# Patient Record
Sex: FEMALE | Race: WHITE
[De-identification: names, ages, dates, MRNs, and addresses within clinical notes are randomized per-mention and may not be internally consistent; named-entity substitution may affect disease eponyms.]

---

## 2020-06-05 ENCOUNTER — HOSPITAL ENCOUNTER (EMERGENCY)
Dept: HOSPITAL 84 - D.ER | Age: 40
Discharge: HOME | End: 2020-06-05
Payer: MEDICAID

## 2020-06-05 VITALS — DIASTOLIC BLOOD PRESSURE: 52 MMHG | SYSTOLIC BLOOD PRESSURE: 101 MMHG

## 2020-06-05 VITALS
HEIGHT: 69 IN | BODY MASS INDEX: 25.23 KG/M2 | WEIGHT: 170.36 LBS | BODY MASS INDEX: 25.23 KG/M2 | WEIGHT: 170.36 LBS | HEIGHT: 69 IN

## 2020-06-05 DIAGNOSIS — N72: ICD-10-CM

## 2020-06-05 DIAGNOSIS — R30.0: ICD-10-CM

## 2020-06-05 DIAGNOSIS — R11.0: ICD-10-CM

## 2020-06-05 DIAGNOSIS — N73.9: Primary | ICD-10-CM

## 2020-06-05 DIAGNOSIS — N89.8: ICD-10-CM

## 2020-06-05 DIAGNOSIS — R10.30: ICD-10-CM

## 2020-08-12 ENCOUNTER — HOSPITAL ENCOUNTER (INPATIENT)
Dept: HOSPITAL 84 - D.ER | Age: 40
LOS: 2 days | Discharge: HOME | DRG: 537 | End: 2020-08-14
Attending: FAMILY MEDICINE | Admitting: FAMILY MEDICINE
Payer: MEDICAID

## 2020-08-12 VITALS — SYSTOLIC BLOOD PRESSURE: 141 MMHG | DIASTOLIC BLOOD PRESSURE: 83 MMHG

## 2020-08-12 VITALS
WEIGHT: 140 LBS | HEIGHT: 69 IN | BODY MASS INDEX: 20.73 KG/M2 | BODY MASS INDEX: 20.73 KG/M2 | HEIGHT: 69 IN | BODY MASS INDEX: 20.73 KG/M2 | WEIGHT: 140 LBS

## 2020-08-12 VITALS — SYSTOLIC BLOOD PRESSURE: 126 MMHG | DIASTOLIC BLOOD PRESSURE: 59 MMHG

## 2020-08-12 VITALS — DIASTOLIC BLOOD PRESSURE: 51 MMHG | SYSTOLIC BLOOD PRESSURE: 133 MMHG

## 2020-08-12 VITALS — SYSTOLIC BLOOD PRESSURE: 158 MMHG | DIASTOLIC BLOOD PRESSURE: 71 MMHG

## 2020-08-12 VITALS — DIASTOLIC BLOOD PRESSURE: 67 MMHG | SYSTOLIC BLOOD PRESSURE: 111 MMHG

## 2020-08-12 VITALS — DIASTOLIC BLOOD PRESSURE: 72 MMHG | SYSTOLIC BLOOD PRESSURE: 169 MMHG

## 2020-08-12 VITALS — DIASTOLIC BLOOD PRESSURE: 76 MMHG | SYSTOLIC BLOOD PRESSURE: 129 MMHG

## 2020-08-12 VITALS — SYSTOLIC BLOOD PRESSURE: 129 MMHG | DIASTOLIC BLOOD PRESSURE: 76 MMHG

## 2020-08-12 VITALS — DIASTOLIC BLOOD PRESSURE: 86 MMHG | SYSTOLIC BLOOD PRESSURE: 118 MMHG

## 2020-08-12 VITALS — DIASTOLIC BLOOD PRESSURE: 76 MMHG | SYSTOLIC BLOOD PRESSURE: 127 MMHG

## 2020-08-12 VITALS — SYSTOLIC BLOOD PRESSURE: 144 MMHG | DIASTOLIC BLOOD PRESSURE: 81 MMHG

## 2020-08-12 VITALS — SYSTOLIC BLOOD PRESSURE: 127 MMHG | DIASTOLIC BLOOD PRESSURE: 76 MMHG

## 2020-08-12 VITALS — DIASTOLIC BLOOD PRESSURE: 78 MMHG | SYSTOLIC BLOOD PRESSURE: 135 MMHG
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
## 2020-08-12 DIAGNOSIS — S73.004A: Primary | ICD-10-CM

## 2020-08-12 DIAGNOSIS — F17.200: ICD-10-CM

## 2020-08-12 DIAGNOSIS — S01.81XA: ICD-10-CM

## 2020-08-12 DIAGNOSIS — M87.9: ICD-10-CM

## 2020-08-12 LAB
ALBUMIN SERPL-MCNC: 3 G/DL (ref 3.4–5)
ALP SERPL-CCNC: 75 U/L (ref 30–120)
ALT SERPL-CCNC: 221 U/L (ref 10–68)
ANION GAP SERPL CALC-SCNC: 11 MMOL/L (ref 8–16)
BACTERIA #/AREA URNS HPF: (no result) /HPF
BASOPHILS NFR BLD AUTO: 0.2 % (ref 0–2)
BILIRUB SERPL-MCNC: 0.59 MG/DL (ref 0.2–1.3)
BILIRUB SERPL-MCNC: NEGATIVE MG/DL
BUN SERPL-MCNC: 13 MG/DL (ref 7–18)
CALCIUM SERPL-MCNC: 7.4 MG/DL (ref 8.5–10.1)
CHLORIDE SERPL-SCNC: 103 MMOL/L (ref 98–107)
CO2 SERPL-SCNC: 27.7 MMOL/L (ref 21–32)
CREAT SERPL-MCNC: 0.7 MG/DL (ref 0.6–1.3)
EOSINOPHIL NFR BLD: 1 % (ref 0–7)
ERYTHROCYTE [DISTWIDTH] IN BLOOD BY AUTOMATED COUNT: 14.8 % (ref 11.5–14.5)
GLOBULIN SER-MCNC: 4.8 G/L
GLUCOSE SERPL-MCNC: 119 MG/DL (ref 74–106)
GLUCOSE SERPL-MCNC: NEGATIVE MG/DL
HCG UR QL: NEGATIVE
HCT VFR BLD CALC: 34 % (ref 36–48)
HGB BLD-MCNC: 11.4 G/DL (ref 12–16)
IMM GRANULOCYTES NFR BLD: 0.2 % (ref 0–5)
KETONES UR STRIP-MCNC: NEGATIVE MG/DL
LYMPHOCYTES NFR BLD AUTO: 11 % (ref 15–50)
MCH RBC QN AUTO: 30.7 PG (ref 26–34)
MCHC RBC AUTO-ENTMCNC: 33.5 G/DL (ref 31–37)
MCV RBC: 91.6 FL (ref 80–100)
MONOCYTES NFR BLD: 11 % (ref 2–11)
NEUTROPHILS NFR BLD AUTO: 76.6 % (ref 40–80)
NITRITE UR-MCNC: NEGATIVE MG/ML
OSMOLALITY SERPL CALC.SUM OF ELEC: 276 MOSM/KG (ref 275–300)
PH UR STRIP: 8 [PH] (ref 5–6)
PLATELET # BLD: 137 10X3/UL (ref 130–400)
PMV BLD AUTO: 10.4 FL (ref 7.4–10.4)
POTASSIUM SERPL-SCNC: 3.7 MMOL/L (ref 3.5–5.1)
PROT SERPL-MCNC: 7.8 G/DL (ref 6.4–8.2)
RBC # BLD AUTO: 3.71 10X6/UL (ref 4–5.4)
RBC #/AREA URNS HPF: (no result) /HPF (ref 0–5)
SODIUM SERPL-SCNC: 138 MMOL/L (ref 136–145)
SQUAMOUS #/AREA URNS HPF: (no result) /HPF (ref 0–5)
UROBILINOGEN UR-MCNC: NORMAL MG/DL
WBC # BLD AUTO: 4.9 10X3/UL (ref 4.8–10.8)
WBC #/AREA URNS HPF: (no result) /HPF

## 2020-08-12 PROCEDURE — 0SS9XZZ REPOSITION RIGHT HIP JOINT, EXTERNAL APPROACH: ICD-10-PCS | Performed by: ORTHOPAEDIC SURGERY

## 2020-08-12 NOTE — NUR
PT LAYING IN BED, EYES CLOSED, EVEN RESPIRATIONS. PIV IN LEFT FOOT, PATENT, NO
REDNESS OR SWELLING. PLACED TELEMETRY ON PT, 74 SR. LACERATIONS ON FOREHEAD
AND ON HAIRLINE. BRUISES ON LEFT SHOULDER. ABDUCTION PILLOW IN PLACE BETWEEN
LEGS. BED LOW, RAILS X2. CL IN REACH. WILL CONTINUE TO MONITOR.

## 2020-08-12 NOTE — NUR
RESTIGN IN BED EYES CLOSED, AROUSED EASILY C/O PAIN TO RIGHT HIP WITH ANY
MOVEMENT, ABD PILLOW IN PLACE, SEE SHIFT ASSESSMENT, CALL LIGHT IN REACH

## 2020-08-12 NOTE — NUR
GUSTAVO GIVEN IM AS ORDERED, PT NOW MUCH CALMER, STATES THAT SHE HAS BEEN USING
HEROIN AND FENTAL AND HAS BEEN TO REHAB FOR THAT A COUPLE OF TIMES

## 2020-08-12 NOTE — NUR
PT CRYING LOUDLY AND TALKING WITH MOTHER ON PHONE SAYING THAT NURSES WOULD NOT
GIVE HER ANY PAIN MEDICATION HELP HER TURN, CNA HAS BEEN IN ROOM MULTIPLE
TIMES TO ASSIST WITH BEDPAN AND REPOSITIONED AND INFORMED MOTHER THAT
HYDROCODONE WAS GIVEN 2 HRS AGO, PT STATES THAT THATS NOT GOING TO CONTROL HER
PAIN BOTH PT AND MOTHER INSISTING THAT DR BE CALLED FOR MORE PAIN
MEDICATION,DR SIDDIQUI CALLED ORDERS RECIEVED FOR TORDOL 60MG IM Q 6 HRS PRN PT
INFORMED WILL GIVEN AS SOON AS AVAILABLE

## 2020-08-12 NOTE — NUR
PT A&O X4. PT ABLE TO EAT SOME OF MEAL. DENIES PAIN. PT ASKED ABOUT WHAT
HAPPENED, EDUCATED PT ON CURRENT SITUATION. EDUCATED PT ON HOW TO ADJUST BED,
CL AND NEEDS, PT VERBALIZED UNDERSTANDING. BED LOW, RAILS X2. CL IN REACH.
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
## 2020-08-13 VITALS — DIASTOLIC BLOOD PRESSURE: 61 MMHG | SYSTOLIC BLOOD PRESSURE: 106 MMHG

## 2020-08-13 VITALS — SYSTOLIC BLOOD PRESSURE: 104 MMHG | DIASTOLIC BLOOD PRESSURE: 63 MMHG

## 2020-08-13 VITALS — DIASTOLIC BLOOD PRESSURE: 63 MMHG | SYSTOLIC BLOOD PRESSURE: 109 MMHG

## 2020-08-13 VITALS — DIASTOLIC BLOOD PRESSURE: 57 MMHG | SYSTOLIC BLOOD PRESSURE: 108 MMHG

## 2020-08-13 VITALS — DIASTOLIC BLOOD PRESSURE: 63 MMHG | SYSTOLIC BLOOD PRESSURE: 111 MMHG

## 2020-08-13 LAB
ANION GAP SERPL CALC-SCNC: 8 MMOL/L (ref 8–16)
BASOPHILS NFR BLD AUTO: 0.1 % (ref 0–2)
BUN SERPL-MCNC: 16 MG/DL (ref 7–18)
CALCIUM SERPL-MCNC: 7.7 MG/DL (ref 8.5–10.1)
CHLORIDE SERPL-SCNC: 106 MMOL/L (ref 98–107)
CO2 SERPL-SCNC: 28.6 MMOL/L (ref 21–32)
CREAT SERPL-MCNC: 0.6 MG/DL (ref 0.6–1.3)
EOSINOPHIL NFR BLD: 1.5 % (ref 0–7)
ERYTHROCYTE [DISTWIDTH] IN BLOOD BY AUTOMATED COUNT: 15.1 % (ref 11.5–14.5)
GLUCOSE SERPL-MCNC: 83 MG/DL (ref 74–106)
HCT VFR BLD CALC: 34.1 % (ref 36–48)
HGB BLD-MCNC: 11.4 G/DL (ref 12–16)
IMM GRANULOCYTES NFR BLD: 0.2 % (ref 0–5)
LYMPHOCYTES NFR BLD AUTO: 18.8 % (ref 15–50)
MCH RBC QN AUTO: 31.1 PG (ref 26–34)
MCHC RBC AUTO-ENTMCNC: 33.4 G/DL (ref 31–37)
MCV RBC: 92.9 FL (ref 80–100)
MONOCYTES NFR BLD: 8.7 % (ref 2–11)
NEUTROPHILS NFR BLD AUTO: 70.7 % (ref 40–80)
OSMOLALITY SERPL CALC.SUM OF ELEC: 277 MOSM/KG (ref 275–300)
PLATELET # BLD: 112 10X3/UL (ref 130–400)
PMV BLD AUTO: 10.7 FL (ref 7.4–10.4)
POTASSIUM SERPL-SCNC: 3.6 MMOL/L (ref 3.5–5.1)
RBC # BLD AUTO: 3.67 10X6/UL (ref 4–5.4)
SODIUM SERPL-SCNC: 139 MMOL/L (ref 136–145)
WBC # BLD AUTO: 9.7 10X3/UL (ref 4.8–10.8)

## 2020-08-13 NOTE — NUR
C/O PAIN 8/10, PROVIDED PAIN MEDS PER ORDER. REMOVED ABDUCTION PILLOW.
EDUCATED PT ABOUT AMBULATING WITH PHYSICAL THERAPY, VERBALIZED UNDERSTANDING.
BED LOW, RAILS X2. CL IN REACH. WILL CONTINUE TO MONITOR.

## 2020-08-13 NOTE — NUR
PT LAYING IN BED, EYES CLOSED, EVEN RESPIRATIONS. BRUISES ON RIGHT SHOULDER.
ABRASIONS ON FOREHEAD AND HAIRLINE. TELEMETRY IN PLACE, 89 SR. EDUCATED SPOUSE
ON GOAL OF AMBULATING WITH PT TODAY. EDUCATED ON CL AND NEEDS. BED LOW, CL IN
REACH. WILL CONTINUE TO MONITOR.

## 2020-08-13 NOTE — MORECARE
CASE MANAGEMENT DISCHARGE SUMMARY
 
 
PATIENT: ZOE ASHRAF                         UNIT: [id number]
ACCOUNT#: [id number]                       ADM DATE: 20
AGE: 40     : [birth year]  SEX: F            ROOM/BED: D.1208    
AUTHOR: PANKAJ ARELLANO                             PHYSICIAN:                               
 
REFERRING PHYSICIAN: MANOJ POLLACK MD              
DATE OF SERVICE: 20
Discharge Plan
 
 
Patient Name: ZOE ASHRAF
Facility: [facility]:[place]
Encounter #: [id number]
Medical Record #: [id number]
: 1980
Planned Disposition: Home
Anticipated Discharge Date: 
 
Discharge Date: 
Expected LOS: 
Initial Reviewer: [email or web address]
Initial Review Date: 2020
Generated: 20   3:34 pm 
Comments
 
DCP- Discharge Planning
 
Updated by [id number]: Lanie Bo on 20   1:34 pm CT
Patient Name: ZOE ASHRAF                                     
Admission Status: ER   
Accout number: [id number]                              
Admission Date: 2020   
: 1980                                                        
Admission Diagnosis:   
Attending: MANOJ POLLACK                                                
Current LOS:  1   
  
Anticipated DC Date:    
Planned Disposition: Home   
Primary Insurance: [place]   
  
  
Discharge Planning Comments:   
  
LATE ENTRY 20 @ 1400  
CM MET WITH PATIENT TO ASSESS DSICHARGE PLANNING NEEDS. PATIENT STATED THAT 
SHE IS INDEPENDENT WITH HER CARE AT HOME BEFORE THIS HAPPENEND. SHE LIVES 
 
WITH HER , BUT THERE ARE 20 STEPS TO GET TO HER HOME. SHE TOLD THE MD 
THAT SHE WAS GOING TO STAY WITH HER MOTHER WHILE SHE RECOVERS.  SHE WILL NEED 
A WALKER AND I WILL ORDER ONE FOR HER BEFORE SHE DISCHARGES. CM TO FOLLOW AND 
ASSIST AS NEEDED  
  
  
  
  
: Lanie Bo
 DCPIA - Discharge Planning Initial Assessment
 
Updated by [id number]: Lanie Bo on 20   2:31 pm
*  Is the patient Alert and Oriented?
Yes
*  How many steps to enter\exit or inside your home? 20 *  PCP DR SANDRO WOLFE IN
[place]
*  Pharmacy
[facility] ON [place]
*  Preadmission Environment
Home with Family
*  ADLs
Independent
*  List name and contact numbers for known caregivers / representatives who 
currently or will assist patient after discharge:
EUSEBIA ( SPOUSE) [phone]
*  Verbal permission to speak to the caregivers and representatives has been 
obtained from the patient.
N/A
*  Community resources currently utilized
None
*  Additional services required to return to the preadmission environment?
Yes
*  Can the patient safely return to the preadmission environment?
Yes
*  Has this patient been hospitalized within the prior 30 days at any 
hospital?
No
 
 
 
 
 
 
Patient Name: ZOE ASHRAF
 
Encounter #: [id number]
Page [id number]
 
 
 
 
 
Electronically Signed by PANKAJ ARELLANO on 20 at 1435
 
 
 
 
 
 
**All edits/amendments must be made on the electronic document**
 
DICTATION DATE: 20     : BENJAMIN  20     
RPT#: [id number]                                DC DATE:        
                                               STATUS: ADM IN  
[facility]
 [place] [address]
***END OF REPORT***

## 2020-08-13 NOTE — MORECARE
CASE MANAGEMENT DISCHARGE SUMMARY
 
 
PATIENT: ZOE ASHRAF                         UNIT: [id number]
ACCOUNT#: [id number]                       ADM DATE: 20
AGE: 40     : 84  SEX: F            ROOM/BED: D.1208    
AUTHOR: PANKAJ ARELLANO                             PHYSICIAN:                               
 
REFERRING PHYSICIAN: MANOJ POLLACK MD              
DATE OF SERVICE: 20
Discharge Plan
 
 
Patient Name: ZOE ASHRAF
Facility: [facility]:[place]
Encounter #: [id number]
Medical Record #: [id number]
: 1980
Planned Disposition: Home
Anticipated Discharge Date: 
 
Discharge Date: 
Expected LOS: 
Initial Reviewer: [email or web address]
Initial Review Date: 2020
Generated: 20   3:41 pm 
Comments
 
DCP- Discharge Planning
 
Updated by [email or web address]: Lanie Bo on 20   1:40 pm CT
WALKER ORDER AND SENT TO [facility]
DCP- Discharge Planning
 
Updated by [email or web address]: Lanie Bo on 20   1:40 pm CT
PATIENT'S CELL NUMBER [phone]
DCP- Discharge Planning
 
Updated by [email or web address]: Lanie Bo on 20   1:34 pm CT
Patient Name: ZOE ASHRAF                                     
Admission Status: ER   
Accout number: [id number]                              
Admission Date: 2020   
: 1980                                                        
Admission Diagnosis:   
Attending: MANOJ POLLACK                                                
Current LOS:  1   
  
Anticipated DC Date:    
Planned Disposition: Home   
 
Primary Insurance: [place]   
  
  
Discharge Planning Comments:   
  
LATE ENTRY 20 @ 1400  
CM MET WITH PATIENT TO ASSESS DSICHARGE PLANNING NEEDS. PATIENT STATED THAT 
SHE IS INDEPENDENT WITH HER CARE AT HOME BEFORE THIS HAPPENEND. SHE LIVES 
WITH HER , BUT THERE ARE 20 STEPS TO GET TO HER HOME. SHE TOLD THE MD 
THAT SHE WAS GOING TO STAY WITH HER MOTHER WHILE SHE RECOVERS.  SHE WILL NEED 
A WALKER AND I WILL ORDER ONE FOR HER BEFORE SHE DISCHARGES. CM TO FOLLOW AND 
ASSIST AS NEEDED  
  
  
  
  
: Lanie Bo
 DCPIA - Discharge Planning Initial Assessment
 
Updated by [email or web address]: Lanie Bo on 20   2:31 pm
*  Is the patient Alert and Oriented?
Yes
*  How many steps to enter\exit or inside your home? 20 *  PCP DR SANDRO WOLFE IN
[place]
*  Pharmacy
[facility] ON [place]
*  Preadmission Environment
Home with Family
*  ADLs
Independent
*  List name and contact numbers for known caregivers / representatives who 
currently or will assist patient after discharge:
EUSEBIA ( SPOUSE) [phone]
*  Verbal permission to speak to the caregivers and representatives has been 
obtained from the patient.
N/A
*  Community resources currently utilized
None
*  Additional services required to return to the preadmission environment?
Yes
*  Can the patient safely return to the preadmission environment?
Yes
*  Has this patient been hospitalized within the prior 30 days at any 
hospital?
No
 
External Providers
External Provider: [facility]
 
Next Contact Date: 
Service Request Date: 
Service Type: 
 
Resolution: 
 
Reviewer: 
Comments: 
 
 
 
 
 
 
Last DP export: 20   1:35 p
Patient Name: ZOE ASHRAF
Encounter #: [id number]
Page [id number]
 
 
 
 
 
Electronically Signed by PANKAJ ARELLANO on 20 at 1442
 
 
 
 
 
 
**All edits/amendments must be made on the electronic document**
 
DICTATION DATE: 20     : BENJAMIN  20     
RPT#: [id number]                                DC DATE:        
                                               STATUS: ADM IN  
[facility]
[address] [place] [address]
***END OF REPORT***

## 2020-08-14 VITALS — DIASTOLIC BLOOD PRESSURE: 50 MMHG | SYSTOLIC BLOOD PRESSURE: 117 MMHG

## 2020-08-14 VITALS — SYSTOLIC BLOOD PRESSURE: 110 MMHG | DIASTOLIC BLOOD PRESSURE: 73 MMHG

## 2020-08-14 VITALS — DIASTOLIC BLOOD PRESSURE: 68 MMHG | SYSTOLIC BLOOD PRESSURE: 113 MMHG

## 2020-08-14 NOTE — MORECARE
CASE MANAGEMENT DISCHARGE SUMMARY
 
 
PATIENT: ZOE ASHRAF                         UNIT: [id number]
ACCOUNT#: [id number]                       ADM DATE: 20
AGE: 40     : 84  SEX: F            ROOM/BED: D.1208    
AUTHOR: PANKAJ ARELLANO                             PHYSICIAN:                               
 
REFERRING PHYSICIAN: MANOJ POLLACK MD              
DATE OF SERVICE: 20
Discharge Plan
 
 
Patient Name: ZOE ASHRAF
Facility: [facility]:[place]
Encounter #: [id number]
Medical Record #: [id number]
: 1980
Planned Disposition: Home
Anticipated Discharge Date: 
 
Discharge Date: 
Expected LOS: 
Initial Reviewer: [email or web address]
Initial Review Date: 2020
Generated: 20   4:22 pm 
Comments
 
DCP- Discharge Planning
 
Updated by [id number]: Lanie Bo on 20   2:21 pm CT
PATIENT DISCHARGING HOME WITH HOME HEALTH,  HER MOTHER WOULD LIKE [facility] IN [place]. THE PATIENT WILL BE DISCHARGING TO [address] [place] [address] IN 
[facility].  I CALLED AND SPOKE WITH JOSEFINA AT THE [place] OFFICE 
[phone]  
I ALSO SPOKE WITH TONNY WITH [facility] ([phone])   
  
I HAVE EXPLAINED ALL OF THIS TO ALE THE PATIENTS NURSE, THE PATIENT HAS A 
WALKER AT THE BEDSIDE
DCP- Discharge Planning
 
Updated by [email or web address]: Lanie Bo on 20   1:40 pm CT
WALKER ORDER AND SENT TO [facility]
DCP- Discharge Planning
 
Updated by [id number]: Lanie Bo on 20   1:40 pm CT
PATIENT'S CELL NUMBER [phone]
DCP- Discharge Planning
 
Updated by [email or web address]: Lanie Bo on 20   1:34 pm CT
 
Patient Name: ZOE ASHRAF                                     
Admission Status: ER   
Accout number: [id number]                              
Admission Date: 2020   
: 1980                                                        
Admission Diagnosis:   
Attending: MANOJ POLLACK                                                
Current LOS:  1   
  
Anticipated DC Date:    
Planned Disposition: Home   
Primary Insurance: [place]   
  
  
Discharge Planning Comments:   
  
LATE ENTRY 20 @ 1400  
CM MET WITH PATIENT TO ASSESS DSICHARGE PLANNING NEEDS. PATIENT STATED THAT 
SHE IS INDEPENDENT WITH HER CARE AT HOME BEFORE THIS HAPPENEND. SHE LIVES 
WITH HER , BUT THERE ARE 20 STEPS TO GET TO HER HOME. SHE TOLD THE MD 
THAT SHE WAS GOING TO STAY WITH HER MOTHER WHILE SHE RECOVERS.  SHE WILL NEED 
A WALKER AND I WILL ORDER ONE FOR HER BEFORE SHE DISCHARGES. CM TO FOLLOW AND 
ASSIST AS NEEDED  
  
  
  
  
: Lanie Bo
 DCPIA - Discharge Planning Initial Assessment
 
Updated by [id number]: Lanie Bo on 20   2:31 pm
*  Is the patient Alert and Oriented?
Yes
*  How many steps to enter\exit or inside your home? 20 *  PCP DR SANDRO WOLFE IN
[place]
*  Pharmacy
[facility] ON [place]
*  Preadmission Environment
Home with Family
*  ADLs
Independent
*  List name and contact numbers for known caregivers / representatives who 
currently or will assist patient after discharge:
EUSEBIA ( SPOUSE) [phone]
*  Verbal permission to speak to the caregivers and representatives has been 
obtained from the patient.
N/A
*  Community resources currently utilized
None
*  Additional services required to return to the preadmission environment?
Yes
*  Can the patient safely return to the preadmission environment?
 
Yes
*  Has this patient been hospitalized within the prior 30 days at any 
hospital?
No
 
 
 
 
 
 
 
Last DP export: 20   2:12 p
Patient Name: ZOE ASHRAF
Encounter #: [id number]
Page 11321
 
 
 
 
 
Electronically Signed by PANKAJ ARELLANO on 20 at 1523
 
 
 
 
 
 
**All edits/amendments must be made on the electronic document**
 
DICTATION DATE: 20     : BENJAMIN  20     
RPT#: [id number]                                DC DATE:        
                                               STATUS: ADM IN  
[facility]
[address] [place] [address]
***END OF REPORT***

## 2020-08-14 NOTE — NUR
DISCHARGED TO HOME AMBULATORY WITH FAMILY. DISCHARGE INSTRUCTIONS GIVEN BOTH
VERBALLY AND WRITTEN. ALL QUESTIONS ANSWERED. PATIENT VERBALIZED UNDERSTANDING
OF SAME. ALL BELONGINGS WITH PATIENT. NEEDED PRESCRIPTIONS GIVEN TO PATIENT.

## 2020-08-14 NOTE — NUR
AWAKE AND ALERT. ATE ABOUT 75% OF BREAKFAST. LUNGS ARE CLEAR BILATERALLY, NO
COUGH NOTED. REPORTED USED IS AS INSTRUCTED. SKIN IS INTACT WITHOUT REDNESS
EXCEPT ABRASIONS TO FOREHEAD WHICH ARE SCABBED AND HEALING. NO IV ACCESS AT
THIS TIME. REQUESTED AND GIVEN ONE HYDROCODONE PO FOR C/O RIGHT HIP PAIN LEVEL
4. WILL MONITOR.

## 2020-08-14 NOTE — MORECARE
CASE MANAGEMENT DISCHARGE SUMMARY
 
 
PATIENT: ZOE ASHRAF                         UNIT: [id number]
ACCOUNT#: [id number]                       ADM DATE: 20
AGE: 40     : 84  SEX: F            ROOM/BED: D.1208    
AUTHOR: PANKAJ ARELLANO                             PHYSICIAN:                               
 
REFERRING PHYSICIAN: MANOJ POLLACK MD              
DATE OF SERVICE: 20
Discharge Plan
 
 
Patient Name: ZOE ASHRAF
Facility: [facility]:[place]
Encounter #: [id number]
Medical Record #: [id number]
: 1980
Planned Disposition: Home
Anticipated Discharge Date: 
 
Discharge Date: 
Expected LOS: 
Initial Reviewer: [id number]
Initial Review Date: 2020
Generated: 20   4:12 pm 
DCP- Discharge Planning
 
Updated by [id number]: Lanie Bo on 20   1:40 pm CT
WALKER ORDER AND SENT TO [facility]
DCP- Discharge Planning
 
Updated by [email or web address]: Lanie Bo on 20   1:40 pm CT
PATIENT'S CELL NUMBER [phone]
DCP- Discharge Planning
 
Updated by [email or web address]: Lanie Bo on 20   1:34 pm CT
Patient Name: ZOE ASHRAF                                     
Admission Status: ER   
Accout number: [id number]                              
Admission Date: 2020   
: 1980                                                        
Admission Diagnosis:   
Attending: MANOJ POLLACK                                                
Current LOS:  1   
  
Anticipated DC Date:    
Planned Disposition: Home   
Primary Insurance: [place]   
  
 
  
Discharge Planning Comments:   
  
LATE ENTRY 20 @ 1400  
CM MET WITH PATIENT TO ASSESS DSICHARGE PLANNING NEEDS. PATIENT STATED THAT 
SHE IS INDEPENDENT WITH HER CARE AT HOME BEFORE THIS HAPPENEND. SHE LIVES 
WITH HER , BUT THERE ARE 20 STEPS TO GET TO HER HOME. SHE TOLD THE MD 
THAT SHE WAS GOING TO STAY WITH HER MOTHER WHILE SHE RECOVERS.  SHE WILL NEED 
A WALKER AND I WILL ORDER ONE FOR HER BEFORE SHE DISCHARGES. CM TO FOLLOW AND 
ASSIST AS NEEDED  
  
  
  
  
: Lanie Bo
 DCPIA - Discharge Planning Initial Assessment
 
Updated by [id number]: Lanie Bo on 20   2:31 pm
*  Is the patient Alert and Oriented?
Yes
*  How many steps to enter\exit or inside your home? 20 *  PCP DR SANDRO WOLFE IN
[place]
*  Pharmacy
[facility] ON [address]
*  Preadmission Environment
Home with Family
*  ADLs
Independent
*  List name and contact numbers for known caregivers / representatives who 
currently or will assist patient after discharge:
EUSEBIA ( SPOUSE) [phone]
*  Verbal permission to speak to the caregivers and representatives has been 
obtained from the patient.
N/A
*  Community resources currently utilized
None
*  Additional services required to return to the preadmission environment?
Yes
*  Can the patient safely return to the preadmission environment?
Yes
*  Has this patient been hospitalized within the prior 30 days at any 
hospital?
No
 
External Providers
External Provider: [facility]
 
Next Contact Date: 
Service Request Date: 
Service Type: 
Resolution: 
 
 
Reviewer: 
Comments: 
 
 
 
 
 
 
Last DP export: 20   1:42 p
Patient Name: ZOE ASHRAF
Encounter #: [id number]
Page 02303
 
 
 
 
 
Electronically Signed by PANKAJ ARELLANO on 20 at 1513
 
 
 
 
 
 
**All edits/amendments must be made on the electronic document**
 
DICTATION DATE: 20     : BENJAMIN  20     
RPT#: [id number]                                DC DATE:        
                                               STATUS: ADM IN  
[facility]
[address] [place] [address]
***END OF REPORT***
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
